# Patient Record
Sex: FEMALE | Race: ASIAN | NOT HISPANIC OR LATINO
[De-identification: names, ages, dates, MRNs, and addresses within clinical notes are randomized per-mention and may not be internally consistent; named-entity substitution may affect disease eponyms.]

---

## 2018-01-23 PROBLEM — Z00.00 ENCOUNTER FOR PREVENTIVE HEALTH EXAMINATION: Status: ACTIVE | Noted: 2018-01-23

## 2018-01-24 ENCOUNTER — APPOINTMENT (OUTPATIENT)
Dept: ULTRASOUND IMAGING | Facility: CLINIC | Age: 38
End: 2018-01-24

## 2018-01-24 ENCOUNTER — OUTPATIENT (OUTPATIENT)
Dept: OUTPATIENT SERVICES | Facility: HOSPITAL | Age: 38
LOS: 1 days | End: 2018-01-24
Payer: COMMERCIAL

## 2018-01-24 DIAGNOSIS — Z00.8 ENCOUNTER FOR OTHER GENERAL EXAMINATION: ICD-10-CM

## 2018-01-24 PROCEDURE — 76536 US EXAM OF HEAD AND NECK: CPT

## 2018-01-24 PROCEDURE — 76536 US EXAM OF HEAD AND NECK: CPT | Mod: 26

## 2018-02-27 ENCOUNTER — OUTPATIENT (OUTPATIENT)
Dept: OUTPATIENT SERVICES | Facility: HOSPITAL | Age: 38
LOS: 1 days | End: 2018-02-27
Payer: COMMERCIAL

## 2018-02-27 ENCOUNTER — APPOINTMENT (OUTPATIENT)
Dept: NUCLEAR MEDICINE | Facility: HOSPITAL | Age: 38
End: 2018-02-27
Payer: COMMERCIAL

## 2018-02-27 DIAGNOSIS — E05.90 THYROTOXICOSIS, UNSPECIFIED WITHOUT THYROTOXIC CRISIS OR STORM: ICD-10-CM

## 2018-02-28 ENCOUNTER — APPOINTMENT (OUTPATIENT)
Dept: NUCLEAR MEDICINE | Facility: HOSPITAL | Age: 38
End: 2018-02-28

## 2018-02-28 PROCEDURE — A9516: CPT

## 2018-02-28 PROCEDURE — 78014 THYROID IMAGING W/BLOOD FLOW: CPT | Mod: 26

## 2018-02-28 PROCEDURE — 78014 THYROID IMAGING W/BLOOD FLOW: CPT

## 2018-03-30 ENCOUNTER — APPOINTMENT (OUTPATIENT)
Dept: ENDOCRINOLOGY | Facility: CLINIC | Age: 38
End: 2018-03-30
Payer: COMMERCIAL

## 2018-03-30 VITALS
BODY MASS INDEX: 23.05 KG/M2 | HEIGHT: 64 IN | HEART RATE: 110 BPM | DIASTOLIC BLOOD PRESSURE: 80 MMHG | WEIGHT: 135 LBS | SYSTOLIC BLOOD PRESSURE: 120 MMHG | OXYGEN SATURATION: 98 %

## 2018-03-30 DIAGNOSIS — Z83.49 FAMILY HISTORY OF OTHER ENDOCRINE, NUTRITIONAL AND METABOLIC DISEASES: ICD-10-CM

## 2018-03-30 PROCEDURE — 99244 OFF/OP CNSLTJ NEW/EST MOD 40: CPT

## 2018-04-03 ENCOUNTER — RX RENEWAL (OUTPATIENT)
Age: 38
End: 2018-04-03

## 2018-04-03 LAB
T3 SERPL-MCNC: >650 NG/DL
T3 SERPL-MCNC: >650 NG/DL
T3FREE SERPL-MCNC: 28.58 PG/ML
T4 FREE SERPL-MCNC: >7.8 NG/DL
T4 SERPL-MCNC: 24.9 UG/DL
T4 SERPL-MCNC: 24.9 UG/DL
TSH SERPL-ACNC: <0.01 UIU/ML
TSI ACT/NOR SER: 25.4 IU/L

## 2018-04-04 LAB — TSH RECEPTOR AB: 25.66 IU/L

## 2018-04-06 ENCOUNTER — OTHER (OUTPATIENT)
Age: 38
End: 2018-04-06

## 2018-04-25 ENCOUNTER — CLINICAL ADVICE (OUTPATIENT)
Age: 38
End: 2018-04-25

## 2018-05-11 LAB
T3 SERPL-MCNC: 216 NG/DL
T3FREE SERPL-MCNC: 6.24 PG/ML
T4 FREE SERPL-MCNC: 2.3 NG/DL
T4 SERPL-MCNC: 12.3 UG/DL
TSH SERPL-ACNC: <0.01 UIU/ML

## 2018-06-11 ENCOUNTER — LABORATORY RESULT (OUTPATIENT)
Age: 38
End: 2018-06-11

## 2018-06-14 ENCOUNTER — RESULT REVIEW (OUTPATIENT)
Age: 38
End: 2018-06-14

## 2018-07-06 ENCOUNTER — APPOINTMENT (OUTPATIENT)
Dept: ENDOCRINOLOGY | Facility: CLINIC | Age: 38
End: 2018-07-06
Payer: COMMERCIAL

## 2018-07-06 VITALS
BODY MASS INDEX: 23.05 KG/M2 | HEIGHT: 64 IN | DIASTOLIC BLOOD PRESSURE: 80 MMHG | OXYGEN SATURATION: 99 % | WEIGHT: 135 LBS | SYSTOLIC BLOOD PRESSURE: 110 MMHG | HEART RATE: 81 BPM

## 2018-07-06 PROCEDURE — 99213 OFFICE O/P EST LOW 20 MIN: CPT

## 2018-07-06 RX ORDER — METHIMAZOLE 10 MG/1
10 TABLET ORAL
Qty: 90 | Refills: 1 | Status: DISCONTINUED | COMMUNITY
Start: 2018-03-30 | End: 2018-07-06

## 2018-07-11 LAB
T3 SERPL-MCNC: 119 NG/DL
T3FREE SERPL-MCNC: 3.27 PG/ML
T4 FREE SERPL-MCNC: 1.2 NG/DL
T4 SERPL-MCNC: 6.6 UG/DL
TSH SERPL-ACNC: 0.01 UIU/ML

## 2018-07-16 ENCOUNTER — RX RENEWAL (OUTPATIENT)
Age: 38
End: 2018-07-16

## 2018-10-19 ENCOUNTER — APPOINTMENT (OUTPATIENT)
Dept: ENDOCRINOLOGY | Facility: CLINIC | Age: 38
End: 2018-10-19
Payer: COMMERCIAL

## 2018-10-19 VITALS
DIASTOLIC BLOOD PRESSURE: 80 MMHG | HEART RATE: 79 BPM | WEIGHT: 135 LBS | BODY MASS INDEX: 23.05 KG/M2 | OXYGEN SATURATION: 98 % | HEIGHT: 64 IN | SYSTOLIC BLOOD PRESSURE: 110 MMHG

## 2018-10-19 LAB
BASOPHILS # BLD AUTO: 0.03 K/UL
BASOPHILS NFR BLD AUTO: 0.6 %
EOSINOPHIL # BLD AUTO: 0.04 K/UL
EOSINOPHIL NFR BLD AUTO: 0.7 %
HCT VFR BLD CALC: 34.2 %
HGB BLD-MCNC: 11.2 G/DL
IMM GRANULOCYTES NFR BLD AUTO: 0.2 %
LYMPHOCYTES # BLD AUTO: 1.82 K/UL
LYMPHOCYTES NFR BLD AUTO: 33.8 %
MAN DIFF?: NORMAL
MCHC RBC-ENTMCNC: 26.7 PG
MCHC RBC-ENTMCNC: 32.7 GM/DL
MCV RBC AUTO: 81.6 FL
MONOCYTES # BLD AUTO: 0.18 K/UL
MONOCYTES NFR BLD AUTO: 3.3 %
NEUTROPHILS # BLD AUTO: 3.3 K/UL
NEUTROPHILS NFR BLD AUTO: 61.4 %
PLATELET # BLD AUTO: 268 K/UL
RBC # BLD: 4.19 M/UL
RBC # FLD: 15.2 %
WBC # FLD AUTO: 5.38 K/UL

## 2018-10-19 PROCEDURE — 99213 OFFICE O/P EST LOW 20 MIN: CPT

## 2018-10-19 RX ORDER — PROPRANOLOL HYDROCHLORIDE 10 MG/1
10 TABLET ORAL
Refills: 0 | Status: DISCONTINUED | COMMUNITY
End: 2018-10-19

## 2018-10-23 LAB
ALBUMIN SERPL ELPH-MCNC: 4.6 G/DL
ALP BLD-CCNC: 91 U/L
ALT SERPL-CCNC: 10 U/L
ANION GAP SERPL CALC-SCNC: 12 MMOL/L
AST SERPL-CCNC: 16 U/L
BILIRUB SERPL-MCNC: 0.2 MG/DL
BUN SERPL-MCNC: 13 MG/DL
CALCIUM SERPL-MCNC: 8.9 MG/DL
CHLORIDE SERPL-SCNC: 102 MMOL/L
CO2 SERPL-SCNC: 25 MMOL/L
CREAT SERPL-MCNC: 0.77 MG/DL
GLUCOSE SERPL-MCNC: 153 MG/DL
POTASSIUM SERPL-SCNC: 3.6 MMOL/L
PROT SERPL-MCNC: 7.2 G/DL
SODIUM SERPL-SCNC: 139 MMOL/L
T3 SERPL-MCNC: 77 NG/DL
T3FREE SERPL-MCNC: 2.02 PG/ML
T4 FREE SERPL-MCNC: 0.8 NG/DL
T4 SERPL-MCNC: 5.1 UG/DL
TSH RECEPTOR AB: 10.04 IU/L
TSH SERPL-ACNC: 7.64 UIU/ML
TSI ACT/NOR SER: 12.1 IU/L

## 2018-11-24 ENCOUNTER — LABORATORY RESULT (OUTPATIENT)
Age: 38
End: 2018-11-24

## 2019-02-15 ENCOUNTER — APPOINTMENT (OUTPATIENT)
Dept: ENDOCRINOLOGY | Facility: CLINIC | Age: 39
End: 2019-02-15
Payer: COMMERCIAL

## 2019-02-15 VITALS
OXYGEN SATURATION: 98 % | WEIGHT: 135 LBS | HEART RATE: 72 BPM | DIASTOLIC BLOOD PRESSURE: 80 MMHG | BODY MASS INDEX: 23.05 KG/M2 | HEIGHT: 64 IN | SYSTOLIC BLOOD PRESSURE: 110 MMHG

## 2019-02-15 PROCEDURE — 99213 OFFICE O/P EST LOW 20 MIN: CPT

## 2019-02-15 NOTE — END OF VISIT
[FreeTextEntry3] : Patient is seen and examined with medical student Ms Jordan Zavala, MS3\par Physical exam, history is conducted separately during this visit. \par This note is co-authored with Ms. Zavala.

## 2019-02-15 NOTE — ASSESSMENT
[FreeTextEntry1] : Ms. BRYAN MONTGOMERY is a 38 year old female with no significant medical history presenting to the office for a f/u for Grave's Disease.\par \par 1. Grave's Disease\par Last Thyroid Function Test on Nov/2018 shows a TSH 2.41 and T4 6.9. \par - c/w methimazole 5mg daily\par - repeat T3, T4, TSH, Thyroid stimulating immunoglobulin, TSH receptor ab \par - discussed with pt the various treatments for hyperthyroidism. Pt expressed that she is potentially interested in having another child but not in the next year. Pt said she wanted to see the results of the thyroid tests today before deciding if she wants to get radioactive iodine ablation or surgery in the future. \par

## 2019-02-15 NOTE — REVIEW OF SYSTEMS
[Negative] : Musculoskeletal [Fatigue] : no fatigue [Recent Weight Gain (___ Lbs)] : no recent weight gain [Recent Weight Loss (___ Lbs)] : no recent weight loss [Chest Pain] : no chest pain [Palpitations] : no palpitations [Lower Ext Edema] : no lower extremity edema [Shortness Of Breath] : no shortness of breath [Wheezing] : no wheezing was heard [Cough] : no cough [Nausea] : no nausea [Constipation] : no constipation [Diarrhea] : no diarrhea [Abdominal Pain] : no abdominal pain [Irregular Menses] : regular menses [Pelvic Pain] : no pelvic pain [Hair Loss] : no hair loss [Headache] : no headaches [Tremors] : no tremors [Dizziness] : no dizziness [Cold Intolerance] : cold tolerant [Heat Intolerance] : heat tolerant [Cushing's Stigmata] : no Cushing's stigmata

## 2019-02-15 NOTE — HISTORY OF PRESENT ILLNESS
[FreeTextEntry1] : Ms. BRYAN MONTGOMERY is a 38 year old female with no significant medical history presenting to the office for a f/u for Grave's Disease.  Pt was diagnosed with Grave's disease last year after she was found to have a TSH <0.001, T4-5.9 and TSH receptor ab 25.66. She was originally on Methimazole 20mg daily but developed hair loss and cold intolerance. Dosage of Methimazole was decreased to 5mg. \par \par She is presenting to the office today with no major complaints. She is tolerating methimazole 5mg daily well. She denies any recent weight changes, fatigue,  palpitations, changes to vision,cold/heat intolerance, diarrhea or constipation.  She reports regular menstrual period. \par Last Thyroid Function Test on Nov/2018 shows a TSH 2.41 and T4 6.9.  \par \par Contact information: \par Pt's cell phone: [phone]\par Pt 's cell phone: [phone]\par

## 2019-02-15 NOTE — PHYSICAL EXAM
[Alert] : alert [No Acute Distress] : no acute distress [Normal Sclera/Conjunctiva] : normal sclera/conjunctiva [EOMI] : extra ocular movement intact [No Proptosis] : no proptosis [Normal Outer Ear/Nose] : the ears and nose were normal in appearance [Normal Hearing] : hearing was normal [Normal Oropharynx] : the oropharynx was normal [Thyroid Not Enlarged] : the thyroid was not enlarged [No Thyroid Nodules] : there were no palpable thyroid nodules [No Respiratory Distress] : no respiratory distress [Normal Rate and Effort] : normal respiratory rhythm and effort [Clear to Auscultation] : lungs were clear to auscultation bilaterally [Normal Rate] : heart rate was normal  [Normal S1, S2] : normal S1 and S2 [Regular Rhythm] : with a regular rhythm [Not Tender] : non-tender [Soft] : abdomen soft [Not Distended] : not distended [No Rash] : no rash [No Skin Lesions] : no skin lesions [No Motor Deficits] : the motor exam was normal [No Tremors] : no tremors [No Sensory Deficits] : the sensory exam was normal to light touch and pinprick [Oriented x3] : oriented to person, place, and time

## 2019-02-22 LAB
T3 SERPL-MCNC: 101 NG/DL
T3FREE SERPL-MCNC: 3.06 PG/ML
T4 FREE SERPL-MCNC: 1.5 NG/DL
T4 SERPL-MCNC: 8 UG/DL
TSH RECEPTOR AB: 4.41 IU/L
TSH SERPL-ACNC: 0.56 UIU/ML
TSI ACT/NOR SER: 6.6 IU/L

## 2019-06-21 ENCOUNTER — APPOINTMENT (OUTPATIENT)
Dept: ENDOCRINOLOGY | Facility: CLINIC | Age: 39
End: 2019-06-21

## 2019-11-08 ENCOUNTER — APPOINTMENT (OUTPATIENT)
Dept: ENDOCRINOLOGY | Facility: CLINIC | Age: 39
End: 2019-11-08
Payer: COMMERCIAL

## 2019-11-08 VITALS
BODY MASS INDEX: 22.53 KG/M2 | OXYGEN SATURATION: 98 % | HEART RATE: 95 BPM | HEIGHT: 64 IN | WEIGHT: 132 LBS | DIASTOLIC BLOOD PRESSURE: 84 MMHG | SYSTOLIC BLOOD PRESSURE: 120 MMHG

## 2019-11-08 PROCEDURE — 99213 OFFICE O/P EST LOW 20 MIN: CPT

## 2019-11-13 ENCOUNTER — RX RENEWAL (OUTPATIENT)
Age: 39
End: 2019-11-13

## 2019-11-13 LAB
T3 SERPL-MCNC: 194 NG/DL
T3FREE SERPL-MCNC: 7.03 PG/ML
T4 FREE SERPL-MCNC: 2.8 NG/DL
T4 SERPL-MCNC: 12.1 UG/DL
TSH RECEPTOR AB: 3.89 IU/L
TSH SERPL-ACNC: 0.01 UIU/ML
TSI ACT/NOR SER: 3.66 IU/L

## 2019-11-13 NOTE — ADDENDUM
[FreeTextEntry1] : Discussed lab results with patient. \par Resume methimazole 5mg once daily. \par blood work in 2 months. \par Should pregnancy be planned to called me immediately and change to PTU.

## 2019-11-13 NOTE — ASSESSMENT
[FreeTextEntry1] : 38-year-old f with history of hyperthyroidism, diagnosed in January 2018, here to establish endocrinology visit. \par \par 1) Hyperthyroidism - Graves' disease. \par \par We had an extensive discussion regarding thyroid disease including: normal physiology of the thyroid and interpretation of thyroid function testing, etiologies of hyperthyroidism, and diagnostic testing to evaluate the cause of hyperthyroidism.\par \par We also had an extensive discussion regarding the treatment options for hyperthyroidism including: medical therapy with anti-thyroid drugs, radioactive iodine ablation, and a thyroidectomy.  We discussed the risks and benefits of each modality.  For anti-thyroid drugs we discussed how it is not considered a definitive therapy, the risks of agranulocytosis and hepatic dysfunction, and the need for regular lab monitoring of her CBC and LFTs.  For radioactive iodine ablation we discussed the risks of transient thyroiditis, exposure to radioactive substances, and the likely consequence of post-ablative hypothyroidism and the need for life-long thyroid hormone replacement. Lastly for surgery we discussed the inherent risk of any surgery/anesthesia, and the need for life-long thyroid hormone replacement. She understands the risks and benefits of each therapy.\par \par We'll proceed with workup including TSH receptor antibody, thyroid-stimulating immunoglobulin. We'll also obtain thyroid function test today; free T4, T3, free T3, TSH\par \par Recommend a repeat thyroid function test today. \par \par I discussed with patient and her  that a fertility as desired, achieving biochemical euthyroidism is important.\par \par If her thyroid function is within normal limits, she may consider fertility at this point.\par \par I discussed with her that if radioactive iodine is use, she should not get pregnant within 6 months.\par \par FU 4 months. \par \par

## 2019-11-13 NOTE — HISTORY OF PRESENT ILLNESS
[FreeTextEntry1] : Patient is a annalise 39-year-old female with history of Graves' disease here for endocrinology followup.\par She was diagnosed with Graves' disease around March 2018.\par She was started on methimazole, her dosage has been titrated over the last 2 years.\par Her initial TFT in March 2018 showed free T4-7 0.8, free T3 28.58, total T3 greater than 650, total T4 24.9, TSH <0.01\par She has a 4-year-old boy.\par Her family and her are still contemplating having a second child.\par She reported that over the last 3 months, she has been feeling well therefore she discontinue her methimazole.  She was previously taking methimazole 5 mg once daily.\par She reports feeling well, no weight loss, no weight gain.  She she reports no tremors, no palpitations, no increased sweating.  Overall feels very well.\par \par Patient would like to consider radioactive iodine should she need definitive treatment for hyperthyroidism.\par CELL PHONE: [phone]\par \par : [phone]\par \par \par

## 2019-11-13 NOTE — PHYSICAL EXAM
[Well Nourished] : well nourished [Well Developed] : well developed [No Proptosis] : no proptosis [Normal Rate and Effort] : normal respiratory rhythm and effort [No Accessory Muscle Use] : no accessory muscle use [Normal Rate] : heart rate was normal  [Normal S1, S2] : normal S1 and S2 [Regular Rhythm] : with a regular rhythm [Soft] : abdomen soft [Not Distended] : not distended [No Tremors] : no tremors [Oriented x3] : oriented to person, place, and time [Normal Insight/Judgement] : insight and judgment were intact [de-identified] : Thyroid enlarged

## 2020-02-18 ENCOUNTER — RX RENEWAL (OUTPATIENT)
Age: 40
End: 2020-02-18

## 2020-03-06 ENCOUNTER — APPOINTMENT (OUTPATIENT)
Dept: ENDOCRINOLOGY | Facility: CLINIC | Age: 40
End: 2020-03-06
Payer: COMMERCIAL

## 2020-03-06 VITALS
BODY MASS INDEX: 23.05 KG/M2 | WEIGHT: 135 LBS | DIASTOLIC BLOOD PRESSURE: 80 MMHG | HEIGHT: 64 IN | SYSTOLIC BLOOD PRESSURE: 118 MMHG | HEART RATE: 73 BPM

## 2020-03-06 PROCEDURE — 99213 OFFICE O/P EST LOW 20 MIN: CPT

## 2020-03-06 NOTE — HISTORY OF PRESENT ILLNESS
[FreeTextEntry1] : Patient is a annalise 39-year-old female with history of Graves' disease here for endocrinology followup.\par \par She was diagnosed with Graves' disease around March 2018.\par \par She was started on methimazole, her dosage has been titrated over the last 2 years.  She is currently taking methimazole 5 mg once daily\par \par Her initial TFT in March 2018 showed free T4-7 0.8, free T3 28.58, total T3 greater than 650, total T4 24.9, TSH <0.01\par She has a 5 year-old boy.\par \par Her family and her are still contemplating having a second child.\par \par She is currently taking methimazole 5 mg once daily.  In the past she had been having variable adherence to her medication.\par \par She reports feeling well, no weight loss, no weight gain.  She she reports no tremors, no palpitations, no increased sweating.  Overall feels very well.\par \par Patient would like to consider radioactive iodine should she need definitive treatment for hyperthyroidism.\par CELL PHONE: [phone]\par \par : [phone]\par \par \par

## 2020-03-06 NOTE — PHYSICAL EXAM
[Well Nourished] : well nourished [Well Developed] : well developed [No Proptosis] : no proptosis [Normal Rate and Effort] : normal respiratory rhythm and effort [No Accessory Muscle Use] : no accessory muscle use [Normal Rate] : heart rate was normal  [Normal S1, S2] : normal S1 and S2 [Regular Rhythm] : with a regular rhythm [Soft] : abdomen soft [Not Distended] : not distended [No Tremors] : no tremors [Oriented x3] : oriented to person, place, and time [Normal Insight/Judgement] : insight and judgment were intact [de-identified] : Thyroid enlarged

## 2020-03-06 NOTE — ASSESSMENT
[FreeTextEntry1] : 8/-year-old f with history of hyperthyroidism, diagnosed in January 2018, here for endocrinology follow-up.\par 1) Hyperthyroidism - Graves' disease, unstable. \par \par We had an extensive discussion regarding thyroid disease including: normal physiology of the thyroid and interpretation of thyroid function testing, etiologies of hyperthyroidism, and diagnostic testing to evaluate the cause of hyperthyroidism.\par \par We also had an extensive discussion regarding the treatment options for hyperthyroidism including: medical therapy with anti-thyroid drugs, radioactive iodine ablation, and a thyroidectomy.  We discussed the risks and benefits of each modality.  For anti-thyroid drugs we discussed how it is not considered a definitive therapy, the risks of agranulocytosis and hepatic dysfunction, and the need for regular lab monitoring of her CBC and LFTs.  For radioactive iodine ablation we discussed the risks of transient thyroiditis, exposure to radioactive substances, and the likely consequence of post-ablative hypothyroidism and the need for life-long thyroid hormone replacement. Lastly for surgery we discussed the inherent risk of any surgery/anesthesia, and the need for life-long thyroid hormone replacement. She understands the risks and benefits of each therapy.\par \par We'll proceed with workup including TSH receptor antibody, thyroid-stimulating immunoglobulin. We'll also obtain thyroid function test today; free T4, T3, free T3, TSH\par \par Recommend a repeat thyroid function test today.  She is currently taking methimazole 5 mg once daily.\par \par I discussed with patient and her  that a fertility as desired, achieving biochemical euthyroidism is important.\par \par If her thyroid function is within normal limits, she may consider fertility at this point.\par \par I discussed with her that if radioactive iodine is use, she should not get pregnant within 6 months.\par \par \par Follow-up in 4 months we will coordinate care for radioactive iodine if she decided to proceed with treatment.  Discussed with patient that she would need to discontinue her methimazole 5 to 7 days prior to repeat uptake and scan study.\par \par

## 2020-03-10 LAB
ALBUMIN SERPL ELPH-MCNC: 4.6 G/DL
ALP BLD-CCNC: 52 U/L
ALT SERPL-CCNC: 13 U/L
AST SERPL-CCNC: 17 U/L
BASOPHILS # BLD AUTO: 0.04 K/UL
BASOPHILS NFR BLD AUTO: 0.6 %
BILIRUB DIRECT SERPL-MCNC: 0.1 MG/DL
BILIRUB INDIRECT SERPL-MCNC: 0.2 MG/DL
BILIRUB SERPL-MCNC: 0.3 MG/DL
EOSINOPHIL # BLD AUTO: 0.05 K/UL
EOSINOPHIL NFR BLD AUTO: 0.7 %
HCT VFR BLD CALC: 40.6 %
HGB BLD-MCNC: 13.5 G/DL
IMM GRANULOCYTES NFR BLD AUTO: 0.4 %
LYMPHOCYTES # BLD AUTO: 2.01 K/UL
LYMPHOCYTES NFR BLD AUTO: 28.5 %
MAN DIFF?: NORMAL
MCHC RBC-ENTMCNC: 29.6 PG
MCHC RBC-ENTMCNC: 33.3 GM/DL
MCV RBC AUTO: 89 FL
MONOCYTES # BLD AUTO: 0.45 K/UL
MONOCYTES NFR BLD AUTO: 6.4 %
NEUTROPHILS # BLD AUTO: 4.47 K/UL
NEUTROPHILS NFR BLD AUTO: 63.4 %
PLATELET # BLD AUTO: 293 K/UL
PROT SERPL-MCNC: 6.8 G/DL
RBC # BLD: 4.56 M/UL
RBC # FLD: 11.9 %
T3 SERPL-MCNC: 160 NG/DL
T3FREE SERPL-MCNC: 4.5 PG/ML
T4 FREE SERPL-MCNC: 1.8 NG/DL
T4 SERPL-MCNC: 9.8 UG/DL
TSH SERPL-ACNC: <0.01 UIU/ML
WBC # FLD AUTO: 7.05 K/UL

## 2020-06-26 ENCOUNTER — APPOINTMENT (OUTPATIENT)
Dept: ENDOCRINOLOGY | Facility: CLINIC | Age: 40
End: 2020-06-26

## 2020-07-01 LAB
ALBUMIN SERPL ELPH-MCNC: 4.8 G/DL
ALP BLD-CCNC: 49 U/L
ALT SERPL-CCNC: 12 U/L
AST SERPL-CCNC: 20 U/L
BASOPHILS # BLD AUTO: 0.04 K/UL
BASOPHILS NFR BLD AUTO: 0.5 %
BILIRUB DIRECT SERPL-MCNC: 0.2 MG/DL
BILIRUB INDIRECT SERPL-MCNC: 0.6 MG/DL
BILIRUB SERPL-MCNC: 0.7 MG/DL
EOSINOPHIL # BLD AUTO: 0.08 K/UL
EOSINOPHIL NFR BLD AUTO: 1.1 %
HCT VFR BLD CALC: 40.4 %
HGB BLD-MCNC: 14.1 G/DL
IMM GRANULOCYTES NFR BLD AUTO: 0.3 %
LYMPHOCYTES # BLD AUTO: 2.63 K/UL
LYMPHOCYTES NFR BLD AUTO: 35.4 %
MAN DIFF?: NORMAL
MCHC RBC-ENTMCNC: 31.6 PG
MCHC RBC-ENTMCNC: 34.9 GM/DL
MCV RBC AUTO: 90.6 FL
MONOCYTES # BLD AUTO: 0.42 K/UL
MONOCYTES NFR BLD AUTO: 5.7 %
NEUTROPHILS # BLD AUTO: 4.24 K/UL
NEUTROPHILS NFR BLD AUTO: 57 %
PLATELET # BLD AUTO: 303 K/UL
PROT SERPL-MCNC: 7.2 G/DL
RBC # BLD: 4.46 M/UL
RBC # FLD: 11.9 %
T3 SERPL-MCNC: 120 NG/DL
T3FREE SERPL-MCNC: 3.68 PG/ML
T4 FREE SERPL-MCNC: 1.5 NG/DL
T4 SERPL-MCNC: 8 UG/DL
TSH SERPL-ACNC: <0.01 UIU/ML
WBC # FLD AUTO: 7.43 K/UL

## 2020-08-27 ENCOUNTER — OUTPATIENT (OUTPATIENT)
Dept: OUTPATIENT SERVICES | Facility: HOSPITAL | Age: 40
LOS: 1 days | End: 2020-08-27
Payer: COMMERCIAL

## 2020-08-27 ENCOUNTER — RESULT REVIEW (OUTPATIENT)
Age: 40
End: 2020-08-27

## 2020-08-27 ENCOUNTER — APPOINTMENT (OUTPATIENT)
Dept: NUCLEAR MEDICINE | Facility: HOSPITAL | Age: 40
End: 2020-08-27

## 2020-08-27 DIAGNOSIS — E05.90 THYROTOXICOSIS, UNSPECIFIED WITHOUT THYROTOXIC CRISIS OR STORM: ICD-10-CM

## 2020-08-28 ENCOUNTER — RESULT REVIEW (OUTPATIENT)
Age: 40
End: 2020-08-28

## 2020-08-28 ENCOUNTER — APPOINTMENT (OUTPATIENT)
Dept: NUCLEAR MEDICINE | Facility: HOSPITAL | Age: 40
End: 2020-08-28

## 2020-08-28 PROCEDURE — 78014 THYROID IMAGING W/BLOOD FLOW: CPT | Mod: 26

## 2020-09-04 ENCOUNTER — APPOINTMENT (OUTPATIENT)
Dept: NUCLEAR MEDICINE | Facility: HOSPITAL | Age: 40
End: 2020-09-04

## 2020-09-04 ENCOUNTER — RESULT REVIEW (OUTPATIENT)
Age: 40
End: 2020-09-04

## 2020-09-04 PROCEDURE — 78014 THYROID IMAGING W/BLOOD FLOW: CPT

## 2020-09-04 PROCEDURE — 79005 NUCLEAR RX ORAL ADMIN: CPT

## 2020-09-04 PROCEDURE — A9517: CPT

## 2020-09-04 PROCEDURE — A9516: CPT

## 2020-09-04 PROCEDURE — 79005 NUCLEAR RX ORAL ADMIN: CPT | Mod: 26

## 2020-10-02 LAB
T3 SERPL-MCNC: 392 NG/DL
T3FREE SERPL-MCNC: 18.2 PG/ML
T4 FREE SERPL-MCNC: 6.1 NG/DL
T4 SERPL-MCNC: 21.1 UG/DL
TSH SERPL-ACNC: 0.01 UIU/ML

## 2020-10-06 ENCOUNTER — APPOINTMENT (OUTPATIENT)
Dept: ENDOCRINOLOGY | Facility: CLINIC | Age: 40
End: 2020-10-06
Payer: COMMERCIAL

## 2020-10-06 VITALS
DIASTOLIC BLOOD PRESSURE: 80 MMHG | TEMPERATURE: 98.2 F | WEIGHT: 125 LBS | BODY MASS INDEX: 21.34 KG/M2 | HEIGHT: 64 IN | SYSTOLIC BLOOD PRESSURE: 120 MMHG

## 2020-10-06 PROCEDURE — 99213 OFFICE O/P EST LOW 20 MIN: CPT

## 2020-10-06 NOTE — ASSESSMENT
[FreeTextEntry1] : 40-year-old f with history of hyperthyroidism, diagnosed in January 2018, here for endocrinology follow-up.\par \par 1) Hyperthyroidism - Graves' disease, status post radioactive iodine and on September 4, 2020.  With post ablation thyroiditis versus persistent hyperthyroidism.  Required to be restarted on Tapazole 7.5 mg once daily.  Also taking beta-blocker with propranolol 10 mg every 8 hours.  Heart rate is well controlled.  Hyperthyroid symptoms also improved.\par We will continue her on current treatment for now.  We will repeat thyroid function tests in 1 month.\par Will continue to trend thyroid level closely.   [Levothyroxine] : The patient was instructed to take Levothyroxine on an empty stomach, separate from vitamins, and wait at least 30 minutes before eating

## 2020-10-06 NOTE — PROCEDURE
[FreeTextEntry1] : Ms. BRYAN MONTGOMERY is 40 year old female here to follow up \par \par #1 Hyperthyroidism. \par S/P CLAY in Sept 4th, 2020; 15.8 mCi. \par She was started on methimazole 7.5mg once daily \par She was also started on propranolol 10mg every 8 hours. \par

## 2020-10-06 NOTE — HISTORY OF PRESENT ILLNESS
[FreeTextEntry1] : Patient is a annalise 39-year-old female with history of Graves' disease here for endocrinology followup.\par \par She was diagnosed with Graves' disease around March 2018.\par \par Her initial TFT in March 2018 showed free T4-7 0.8, free T3 28.58, total T3 greater than 650, total T4 24.9, TSH <0.01\par She was started on methimazole.  She underwent definitive treatment with radioactive iodine in September 4 2020, received 15.8 mCi I-131 sodium iodine capsule p.o.\par \par Since her radioactive iodine, patient will to me and complained of palpitation, hyperthyroid symptoms.  She was restarted on propranolol 10 mg 3 times daily as needed for tachycardia.  Thyroid function was repeated on October 2, 2020 notable for suppressed TSH of 0.01, free T3 of 18.2, reference range 1.8–4.6: Free T4 6.1, reference range 0.9–1.8, total T3 392, reference range 80–200, and total T4 21.1, reference range 4.6-12.  Patient was restarted on methimazole 7.5 mg once daily on October 2, 2020.  She is here for endocrinology follow-up.  She reports that after initiation of Tapazole 7.5 mg once daily.  Her palpitation had improved, she uses an iwatch to monitor heart rate and now ranging between 80-90 beats per minutes.\par \par Her family and her are still contemplating having a second child.\par \par \par CELL PHONE: [phone]\par \par : [phone]\par \par \par

## 2020-11-13 LAB
T3 SERPL-MCNC: 146 NG/DL
T3FREE SERPL-MCNC: 4.88 PG/ML
T4 FREE SERPL-MCNC: 2.2 NG/DL
T4 SERPL-MCNC: 11 UG/DL
TSH SERPL-ACNC: <0.01 UIU/ML

## 2020-12-15 LAB
T3 SERPL-MCNC: <20 NG/DL
T3FREE SERPL-MCNC: 0.4 PG/ML
T4 FREE SERPL-MCNC: 0.2 NG/DL
T4 SERPL-MCNC: 1.6 UG/DL
TSH SERPL-ACNC: 44.8 UIU/ML

## 2020-12-29 LAB
T3 SERPL-MCNC: <20 NG/DL
T3FREE SERPL-MCNC: <0.39 PG/ML
T4 FREE SERPL-MCNC: 0.1 NG/DL
T4 SERPL-MCNC: 1.1 UG/DL
TSH SERPL-ACNC: 115 UIU/ML

## 2021-01-26 LAB
T4 FREE SERPL-MCNC: 0.9 NG/DL
TSH SERPL-ACNC: 75.6 UIU/ML

## 2021-01-27 LAB — THYROPEROXIDASE AB SERPL IA-ACNC: ABNORMAL IU/ML

## 2021-02-12 ENCOUNTER — APPOINTMENT (OUTPATIENT)
Dept: ENDOCRINOLOGY | Facility: CLINIC | Age: 41
End: 2021-02-12

## 2021-02-25 ENCOUNTER — NON-APPOINTMENT (OUTPATIENT)
Age: 41
End: 2021-02-25

## 2021-03-01 LAB
T3 SERPL-MCNC: 69 NG/DL
T3FREE SERPL-MCNC: 2.06 PG/ML
T4 FREE SERPL-MCNC: 1.2 NG/DL
T4 SERPL-MCNC: 7.1 UG/DL
TSH SERPL-ACNC: 32.5 UIU/ML

## 2021-04-01 LAB
T4 FREE SERPL-MCNC: 1.2 NG/DL
TSH SERPL-ACNC: 28.5 UIU/ML

## 2021-05-05 LAB
T4 FREE SERPL-MCNC: 1.5 NG/DL
TSH SERPL-ACNC: 3.1 UIU/ML

## 2021-06-02 LAB
T4 FREE SERPL-MCNC: 1.4 NG/DL
TSH SERPL-ACNC: 1.85 UIU/ML

## 2021-09-22 ENCOUNTER — RX RENEWAL (OUTPATIENT)
Age: 41
End: 2021-09-22

## 2021-10-12 LAB
T4 FREE SERPL-MCNC: 1.5 NG/DL
TSH SERPL-ACNC: 9.06 UIU/ML

## 2021-11-18 ENCOUNTER — APPOINTMENT (OUTPATIENT)
Dept: ULTRASOUND IMAGING | Facility: CLINIC | Age: 41
End: 2021-11-18
Payer: COMMERCIAL

## 2021-11-18 ENCOUNTER — OUTPATIENT (OUTPATIENT)
Dept: OUTPATIENT SERVICES | Facility: HOSPITAL | Age: 41
LOS: 1 days | End: 2021-11-18
Payer: COMMERCIAL

## 2021-11-18 DIAGNOSIS — Z00.8 ENCOUNTER FOR OTHER GENERAL EXAMINATION: ICD-10-CM

## 2021-11-18 PROCEDURE — 77066 DX MAMMO INCL CAD BI: CPT

## 2021-11-18 PROCEDURE — G0279: CPT | Mod: 26

## 2021-11-18 PROCEDURE — 77066 DX MAMMO INCL CAD BI: CPT | Mod: 26

## 2021-11-18 PROCEDURE — 76641 ULTRASOUND BREAST COMPLETE: CPT

## 2021-11-18 PROCEDURE — G0279: CPT

## 2021-11-18 PROCEDURE — 76641 ULTRASOUND BREAST COMPLETE: CPT | Mod: 26,50

## 2021-12-21 LAB
T4 FREE SERPL-MCNC: 1.4 NG/DL
TSH SERPL-ACNC: 4.73 UIU/ML

## 2022-02-17 ENCOUNTER — RX CHANGE (OUTPATIENT)
Age: 42
End: 2022-02-17

## 2022-05-06 ENCOUNTER — APPOINTMENT (OUTPATIENT)
Dept: MAMMOGRAPHY | Facility: CLINIC | Age: 42
End: 2022-05-06
Payer: COMMERCIAL

## 2022-05-06 ENCOUNTER — OUTPATIENT (OUTPATIENT)
Dept: OUTPATIENT SERVICES | Facility: HOSPITAL | Age: 42
LOS: 1 days | End: 2022-05-06
Payer: COMMERCIAL

## 2022-05-06 DIAGNOSIS — Z00.8 ENCOUNTER FOR OTHER GENERAL EXAMINATION: ICD-10-CM

## 2022-05-06 PROCEDURE — 77066 DX MAMMO INCL CAD BI: CPT

## 2022-05-06 PROCEDURE — 77066 DX MAMMO INCL CAD BI: CPT | Mod: 26

## 2022-09-06 ENCOUNTER — RX RENEWAL (OUTPATIENT)
Age: 42
End: 2022-09-06

## 2022-09-08 DIAGNOSIS — Z20.822 CONTACT WITH AND (SUSPECTED) EXPOSURE TO COVID-19: ICD-10-CM

## 2022-09-14 LAB
COVID-19 SPIKE DOMAIN ANTIBODY INTERPRETATION: POSITIVE
SARS-COV-2 AB SERPL IA-ACNC: >250 U/ML
T4 FREE SERPL-MCNC: 1.3 NG/DL
TSH SERPL-ACNC: 4.35 UIU/ML

## 2022-09-14 RX ORDER — METHIMAZOLE 5 MG/1
5 TABLET ORAL DAILY
Qty: 135 | Refills: 1 | Status: DISCONTINUED | COMMUNITY
Start: 2018-04-25 | End: 2022-09-14

## 2022-09-14 RX ORDER — LEVOTHYROXINE SODIUM 0.17 MG/1
175 TABLET ORAL
Qty: 90 | Refills: 0 | Status: DISCONTINUED | COMMUNITY
Start: 2020-12-29 | End: 2022-09-14

## 2022-09-14 RX ORDER — PROPRANOLOL HYDROCHLORIDE 10 MG/1
10 TABLET ORAL
Qty: 90 | Refills: 1 | Status: DISCONTINUED | COMMUNITY
Start: 2020-09-25 | End: 2022-09-14

## 2022-11-04 ENCOUNTER — APPOINTMENT (OUTPATIENT)
Dept: MAMMOGRAPHY | Facility: CLINIC | Age: 42
End: 2022-11-04

## 2022-11-04 ENCOUNTER — APPOINTMENT (OUTPATIENT)
Dept: ULTRASOUND IMAGING | Facility: CLINIC | Age: 42
End: 2022-11-04

## 2022-11-04 ENCOUNTER — OUTPATIENT (OUTPATIENT)
Dept: OUTPATIENT SERVICES | Facility: HOSPITAL | Age: 42
LOS: 1 days | End: 2022-11-04
Payer: COMMERCIAL

## 2022-11-04 DIAGNOSIS — Z00.8 ENCOUNTER FOR OTHER GENERAL EXAMINATION: ICD-10-CM

## 2022-11-04 PROCEDURE — 77066 DX MAMMO INCL CAD BI: CPT | Mod: 26

## 2022-11-04 PROCEDURE — G0279: CPT | Mod: 26

## 2022-11-04 PROCEDURE — 76641 ULTRASOUND BREAST COMPLETE: CPT | Mod: 26,50

## 2022-11-04 PROCEDURE — 76641 ULTRASOUND BREAST COMPLETE: CPT

## 2022-11-04 PROCEDURE — G0279: CPT

## 2022-11-04 PROCEDURE — 77066 DX MAMMO INCL CAD BI: CPT

## 2023-03-22 ENCOUNTER — RX RENEWAL (OUTPATIENT)
Age: 43
End: 2023-03-22

## 2023-05-09 ENCOUNTER — APPOINTMENT (OUTPATIENT)
Dept: ENDOCRINOLOGY | Facility: CLINIC | Age: 43
End: 2023-05-09
Payer: COMMERCIAL

## 2023-05-09 VITALS
WEIGHT: 140 LBS | DIASTOLIC BLOOD PRESSURE: 88 MMHG | BODY MASS INDEX: 23.9 KG/M2 | HEART RATE: 91 BPM | SYSTOLIC BLOOD PRESSURE: 124 MMHG | HEIGHT: 64 IN | OXYGEN SATURATION: 98 %

## 2023-05-09 LAB
T4 FREE SERPL-MCNC: 2 NG/DL
TSH SERPL-ACNC: 0.28 UIU/ML

## 2023-05-09 PROCEDURE — 99213 OFFICE O/P EST LOW 20 MIN: CPT

## 2023-05-09 NOTE — ASSESSMENT
[FreeTextEntry1] : Patient is a 43-year-old woman with history of Graves' disease, status post CLAY, now with post ablative hypothyroidism.\par \par 1.  Hypothyroidism\par Continue with levothyroxine 200 mcg once daily.\par TFT within normal limits in May 5, 2023.\par Patient is still contemplating regarding pregnancy.\par Discussed with patient that her TSH is in the normal range for pregnancy planning.\par Patient will contact me if she is pregnant.\par Follow-up in 6 months.

## 2023-05-09 NOTE — HISTORY OF PRESENT ILLNESS
[FreeTextEntry1] : 43-year-old woman with history of Graves' disease, status post CLAY in September 2020, received 15.8 mCi of I-131 sodium iodine; now with hypothyroidism.\par \par Currently taking levothyroxine 200 mcg once daily.  TFT have been elevated in the past.  Most recent with a normal TSH and slightly elevated free T4 number.\par \par Patient without signs and symptoms of hyper or hypothyroidism.  Denies any palpitations.  States that her heart rate is around 70 bpm on a regular basis.\par \par Cell phone [phone]\par Has been phone number [phone].\par \par Still contemplating about pregnancy.\par \par

## 2023-06-29 ENCOUNTER — EMERGENCY (EMERGENCY)
Facility: HOSPITAL | Age: 43
LOS: 1 days | Discharge: HOME | End: 2023-06-29
Admitting: EMERGENCY MEDICINE
Payer: COMMERCIAL

## 2023-06-29 VITALS
RESPIRATION RATE: 18 BRPM | DIASTOLIC BLOOD PRESSURE: 107 MMHG | OXYGEN SATURATION: 100 % | SYSTOLIC BLOOD PRESSURE: 159 MMHG | TEMPERATURE: 99 F | HEART RATE: 87 BPM

## 2023-06-29 PROCEDURE — 99284 EMERGENCY DEPT VISIT MOD MDM: CPT

## 2023-06-29 NOTE — ED ADULT TRIAGE NOTE - CHIEF COMPLAINT QUOTE
c/o vaginal bleeding onset 8 days ago progressively worse, reports now has dizziness and palpitations, denies any other Hx.

## 2023-06-29 NOTE — ED PROVIDER NOTE - OBJECTIVE STATEMENT
Patient is a 43-year-old female with no significant past medical history presenting with a complaint of vaginal bleeding in the setting of prolonged period.  Patient states that this.  Gone on for over 1 week which is atypical for her, and associated with lightheadedness and dizziness.  Patient states that her last.  Was 1 month late.  She denies no use of hormonal therapies, recent pregnancies, miscarriages.  Box

## 2023-06-29 NOTE — ED PROVIDER NOTE - CLINICAL SUMMARY MEDICAL DECISION MAKING FREE TEXT BOX
patient is a 43-year-old female with no significant past medical history presenting with complaint of vaginal bleeding in the setting of prolonged period.  Patient states that her period has lasted for over 1 week which is atypical for her.  Patient endorses previous being roughly 1 month late prior to onset of symptoms.  On presentation patient well-appearing, vital stable, currently asymptomatic at this present time.  Shared decision making with patient, plan for routine labs to rule out anemia patient will follow-up with her GYN to obtain ultrasound and further evaluation.  Return precautions discussed.

## 2023-06-29 NOTE — ED PROVIDER NOTE - PATIENT PORTAL LINK FT
You can access the FollowMyHealth Patient Portal offered by [facility] by registering at the following website: [email or web address]. By joining [employer]’s FollowMyHealth portal, you will also be able to view your health information using other applications (apps) compatible with our system.

## 2023-06-29 NOTE — ED ADULT NURSE NOTE - NSFALLUNIVINTERV_ED_ALL_ED
Bed/Stretcher in lowest position, wheels locked, appropriate side rails in place/Call bell, personal items and telephone in reach/Instruct patient to call for assistance before getting out of bed/chair/stretcher/Non-slip footwear applied when patient is off stretcher/[employer] to call system/Physically safe environment - no spills, clutter or unnecessary equipment/Purposeful proactive rounding/Room/bathroom lighting operational, light cord in reach

## 2023-06-29 NOTE — ED ADULT NURSE NOTE - OBJECTIVE STATEMENT
43y female with past medical history of hypothyroidism c/o vaginal bleeding. pt stated that she got her period but it is lasting longer then usual

## 2023-06-30 LAB
ALBUMIN SERPL ELPH-MCNC: 4.6 G/DL — SIGNIFICANT CHANGE UP (ref 3.3–5)
ALP SERPL-CCNC: 51 U/L — SIGNIFICANT CHANGE UP (ref 40–120)
ALT FLD-CCNC: 9 U/L — SIGNIFICANT CHANGE UP (ref 4–33)
ANION GAP SERPL CALC-SCNC: 11 MMOL/L — SIGNIFICANT CHANGE UP (ref 7–14)
AST SERPL-CCNC: 13 U/L — SIGNIFICANT CHANGE UP (ref 4–32)
BASOPHILS # BLD AUTO: 0.03 K/UL — SIGNIFICANT CHANGE UP (ref 0–0.2)
BASOPHILS NFR BLD AUTO: 0.6 % — SIGNIFICANT CHANGE UP (ref 0–2)
BILIRUB SERPL-MCNC: 0.4 MG/DL — SIGNIFICANT CHANGE UP (ref 0.2–1.2)
BUN SERPL-MCNC: 11 MG/DL — SIGNIFICANT CHANGE UP (ref 7–23)
CALCIUM SERPL-MCNC: 8.9 MG/DL — SIGNIFICANT CHANGE UP (ref 8.4–10.5)
CHLORIDE SERPL-SCNC: 103 MMOL/L — SIGNIFICANT CHANGE UP (ref 98–107)
CO2 SERPL-SCNC: 25 MMOL/L — SIGNIFICANT CHANGE UP (ref 22–31)
CREAT SERPL-MCNC: 0.66 MG/DL — SIGNIFICANT CHANGE UP (ref 0.5–1.3)
EGFR: 113 ML/MIN/1.73M2 — SIGNIFICANT CHANGE UP
EOSINOPHIL # BLD AUTO: 0.03 K/UL — SIGNIFICANT CHANGE UP (ref 0–0.5)
EOSINOPHIL NFR BLD AUTO: 0.6 % — SIGNIFICANT CHANGE UP (ref 0–6)
GLUCOSE SERPL-MCNC: 106 MG/DL — HIGH (ref 70–99)
HCG SERPL-ACNC: <1 MIU/ML — SIGNIFICANT CHANGE UP
HCT VFR BLD CALC: 32.8 % — LOW (ref 34.5–45)
HGB BLD-MCNC: 10.7 G/DL — LOW (ref 11.5–15.5)
IANC: 3.45 K/UL — SIGNIFICANT CHANGE UP (ref 1.8–7.4)
IMM GRANULOCYTES NFR BLD AUTO: 0.4 % — SIGNIFICANT CHANGE UP (ref 0–0.9)
LYMPHOCYTES # BLD AUTO: 1.39 K/UL — SIGNIFICANT CHANGE UP (ref 1–3.3)
LYMPHOCYTES # BLD AUTO: 26.2 % — SIGNIFICANT CHANGE UP (ref 13–44)
MCHC RBC-ENTMCNC: 26.8 PG — LOW (ref 27–34)
MCHC RBC-ENTMCNC: 32.6 GM/DL — SIGNIFICANT CHANGE UP (ref 32–36)
MCV RBC AUTO: 82.2 FL — SIGNIFICANT CHANGE UP (ref 80–100)
MONOCYTES # BLD AUTO: 0.38 K/UL — SIGNIFICANT CHANGE UP (ref 0–0.9)
MONOCYTES NFR BLD AUTO: 7.2 % — SIGNIFICANT CHANGE UP (ref 2–14)
NEUTROPHILS # BLD AUTO: 3.45 K/UL — SIGNIFICANT CHANGE UP (ref 1.8–7.4)
NEUTROPHILS NFR BLD AUTO: 65 % — SIGNIFICANT CHANGE UP (ref 43–77)
NRBC # BLD: 0 /100 WBCS — SIGNIFICANT CHANGE UP (ref 0–0)
NRBC # FLD: 0 K/UL — SIGNIFICANT CHANGE UP (ref 0–0)
PLATELET # BLD AUTO: 277 K/UL — SIGNIFICANT CHANGE UP (ref 150–400)
POTASSIUM SERPL-MCNC: 4.2 MMOL/L — SIGNIFICANT CHANGE UP (ref 3.5–5.3)
POTASSIUM SERPL-SCNC: 4.2 MMOL/L — SIGNIFICANT CHANGE UP (ref 3.5–5.3)
PROT SERPL-MCNC: 7.3 G/DL — SIGNIFICANT CHANGE UP (ref 6–8.3)
RBC # BLD: 3.99 M/UL — SIGNIFICANT CHANGE UP (ref 3.8–5.2)
RBC # FLD: 12.6 % — SIGNIFICANT CHANGE UP (ref 10.3–14.5)
SODIUM SERPL-SCNC: 139 MMOL/L — SIGNIFICANT CHANGE UP (ref 135–145)
WBC # BLD: 5.3 K/UL — SIGNIFICANT CHANGE UP (ref 3.8–10.5)
WBC # FLD AUTO: 5.3 K/UL — SIGNIFICANT CHANGE UP (ref 3.8–10.5)

## 2023-07-06 LAB
T4 FREE SERPL-MCNC: 1.4 NG/DL
TSH SERPL-ACNC: 3.63 UIU/ML

## 2023-09-25 ENCOUNTER — RX RENEWAL (OUTPATIENT)
Age: 43
End: 2023-09-25

## 2023-10-05 LAB
T4 FREE SERPL-MCNC: 1.9 NG/DL
TSH SERPL-ACNC: 0.91 UIU/ML

## 2023-11-10 ENCOUNTER — OUTPATIENT (OUTPATIENT)
Dept: OUTPATIENT SERVICES | Facility: HOSPITAL | Age: 43
LOS: 1 days | End: 2023-11-10
Payer: COMMERCIAL

## 2023-11-10 ENCOUNTER — APPOINTMENT (OUTPATIENT)
Dept: ULTRASOUND IMAGING | Facility: CLINIC | Age: 43
End: 2023-11-10
Payer: COMMERCIAL

## 2023-11-10 ENCOUNTER — APPOINTMENT (OUTPATIENT)
Dept: MAMMOGRAPHY | Facility: CLINIC | Age: 43
End: 2023-11-10
Payer: COMMERCIAL

## 2023-11-10 DIAGNOSIS — Z00.8 ENCOUNTER FOR OTHER GENERAL EXAMINATION: ICD-10-CM

## 2023-11-10 DIAGNOSIS — Z12.31 ENCOUNTER FOR SCREENING MAMMOGRAM FOR MALIGNANT NEOPLASM OF BREAST: ICD-10-CM

## 2023-11-10 PROBLEM — Z78.9 OTHER SPECIFIED HEALTH STATUS: Chronic | Status: ACTIVE | Noted: 2023-06-29

## 2023-11-10 PROCEDURE — 77063 BREAST TOMOSYNTHESIS BI: CPT | Mod: 26

## 2023-11-10 PROCEDURE — 77067 SCR MAMMO BI INCL CAD: CPT | Mod: 26

## 2023-11-10 PROCEDURE — 77063 BREAST TOMOSYNTHESIS BI: CPT

## 2023-11-10 PROCEDURE — 77067 SCR MAMMO BI INCL CAD: CPT

## 2023-12-05 ENCOUNTER — APPOINTMENT (OUTPATIENT)
Dept: ENDOCRINOLOGY | Facility: CLINIC | Age: 43
End: 2023-12-05
Payer: COMMERCIAL

## 2023-12-05 VITALS
DIASTOLIC BLOOD PRESSURE: 80 MMHG | BODY MASS INDEX: 24.75 KG/M2 | OXYGEN SATURATION: 98 % | HEART RATE: 94 BPM | WEIGHT: 145 LBS | SYSTOLIC BLOOD PRESSURE: 120 MMHG | HEIGHT: 64 IN

## 2023-12-05 DIAGNOSIS — E03.9 HYPOTHYROIDISM, UNSPECIFIED: ICD-10-CM

## 2023-12-05 DIAGNOSIS — Z86.39 PERSONAL HISTORY OF OTHER ENDOCRINE, NUTRITIONAL AND METABOLIC DISEASE: ICD-10-CM

## 2023-12-05 PROCEDURE — 99213 OFFICE O/P EST LOW 20 MIN: CPT

## 2023-12-08 LAB
T4 FREE SERPL-MCNC: 1.8 NG/DL
TSH SERPL-ACNC: 0.56 UIU/ML

## 2024-04-06 ENCOUNTER — RX RENEWAL (OUTPATIENT)
Age: 44
End: 2024-04-06

## 2024-04-06 RX ORDER — LEVOTHYROXINE SODIUM 0.2 MG/1
200 TABLET ORAL
Qty: 90 | Refills: 1 | Status: ACTIVE | COMMUNITY
Start: 2022-09-14 | End: 1900-01-01

## 2024-10-04 ENCOUNTER — RX RENEWAL (OUTPATIENT)
Age: 44
End: 2024-10-04

## 2024-11-26 ENCOUNTER — APPOINTMENT (OUTPATIENT)
Dept: ENDOCRINOLOGY | Facility: CLINIC | Age: 44
End: 2024-11-26
Payer: COMMERCIAL

## 2024-11-26 VITALS
HEIGHT: 64 IN | OXYGEN SATURATION: 95 % | SYSTOLIC BLOOD PRESSURE: 118 MMHG | WEIGHT: 146 LBS | DIASTOLIC BLOOD PRESSURE: 70 MMHG | BODY MASS INDEX: 24.92 KG/M2 | HEART RATE: 65 BPM

## 2024-11-26 DIAGNOSIS — E03.9 HYPOTHYROIDISM, UNSPECIFIED: ICD-10-CM

## 2024-11-26 PROCEDURE — 99213 OFFICE O/P EST LOW 20 MIN: CPT

## 2024-11-27 LAB
T4 FREE SERPL-MCNC: 1.6 NG/DL
TSH SERPL-ACNC: 0.93 UIU/ML

## 2024-12-07 ENCOUNTER — APPOINTMENT (OUTPATIENT)
Dept: ULTRASOUND IMAGING | Facility: CLINIC | Age: 44
End: 2024-12-07

## 2024-12-07 ENCOUNTER — OUTPATIENT (OUTPATIENT)
Dept: OUTPATIENT SERVICES | Facility: HOSPITAL | Age: 44
LOS: 1 days | End: 2024-12-07
Payer: COMMERCIAL

## 2024-12-07 DIAGNOSIS — E03.9 HYPOTHYROIDISM, UNSPECIFIED: ICD-10-CM

## 2024-12-07 PROCEDURE — 76536 US EXAM OF HEAD AND NECK: CPT

## 2024-12-07 PROCEDURE — 76536 US EXAM OF HEAD AND NECK: CPT | Mod: 26

## 2025-06-24 ENCOUNTER — APPOINTMENT (OUTPATIENT)
Dept: ENDOCRINOLOGY | Facility: CLINIC | Age: 45
End: 2025-06-24